# Patient Record
Sex: MALE | Race: WHITE
[De-identification: names, ages, dates, MRNs, and addresses within clinical notes are randomized per-mention and may not be internally consistent; named-entity substitution may affect disease eponyms.]

---

## 2018-12-26 ENCOUNTER — HOSPITAL ENCOUNTER (OUTPATIENT)
Dept: HOSPITAL 90 - SHCH | Age: 68
Discharge: HOME | End: 2018-12-26
Attending: INTERNAL MEDICINE
Payer: COMMERCIAL

## 2018-12-26 DIAGNOSIS — I87.2: Primary | ICD-10-CM

## 2018-12-26 PROCEDURE — 93970 EXTREMITY STUDY: CPT

## 2019-06-10 ENCOUNTER — HOSPITAL ENCOUNTER (OUTPATIENT)
Dept: HOSPITAL 90 - SHCH | Age: 69
Discharge: HOME | End: 2019-06-10
Attending: INTERNAL MEDICINE
Payer: COMMERCIAL

## 2019-06-10 DIAGNOSIS — Z09: Primary | ICD-10-CM

## 2019-06-10 PROCEDURE — 93971 EXTREMITY STUDY: CPT

## 2019-10-08 ENCOUNTER — HOSPITAL ENCOUNTER (OUTPATIENT)
Dept: HOSPITAL 90 - SHCH | Age: 69
Discharge: HOME | End: 2019-10-08
Attending: INTERNAL MEDICINE
Payer: COMMERCIAL

## 2019-10-08 DIAGNOSIS — Z09: Primary | ICD-10-CM

## 2019-10-08 PROCEDURE — 93971 EXTREMITY STUDY: CPT

## 2023-04-20 ENCOUNTER — HOSPITAL ENCOUNTER (OUTPATIENT)
Dept: HOSPITAL 90 - SHCH | Age: 73
Discharge: HOME | End: 2023-04-20
Attending: INTERNAL MEDICINE
Payer: COMMERCIAL

## 2023-04-20 DIAGNOSIS — I73.9: ICD-10-CM

## 2023-04-20 DIAGNOSIS — I87.2: Primary | ICD-10-CM

## 2023-04-20 PROCEDURE — 93970 EXTREMITY STUDY: CPT

## 2023-04-20 PROCEDURE — 93925 LOWER EXTREMITY STUDY: CPT

## 2023-04-28 ENCOUNTER — HOSPITAL ENCOUNTER (OUTPATIENT)
Dept: HOSPITAL 90 - SHCH | Age: 73
Discharge: HOME | End: 2023-04-28
Attending: INTERNAL MEDICINE
Payer: COMMERCIAL

## 2023-04-28 DIAGNOSIS — I25.10: Primary | ICD-10-CM

## 2023-04-28 PROCEDURE — 96374 THER/PROPH/DIAG INJ IV PUSH: CPT

## 2023-04-28 PROCEDURE — 78452 HT MUSCLE IMAGE SPECT MULT: CPT

## 2023-04-28 PROCEDURE — 93017 CV STRESS TEST TRACING ONLY: CPT

## 2024-12-02 ENCOUNTER — HOSPITAL ENCOUNTER (OUTPATIENT)
Dept: HOSPITAL 90 - SHCH | Age: 74
Discharge: HOME | End: 2024-12-02
Attending: INTERNAL MEDICINE
Payer: COMMERCIAL

## 2024-12-02 DIAGNOSIS — I87.2: Primary | ICD-10-CM

## 2024-12-02 PROCEDURE — 93970 EXTREMITY STUDY: CPT

## 2024-12-02 NOTE — [facility]
--------------- APPROVED REPORT --------------





Bilateral Lower Extremity Venous Study for DVT., Venous Competence.Study performed with patient in up
right position or in reverse Trendelenburg.



Past History

06/2019 CV LGSV, 10/2019 CV RGSV, 06/2023 LCIV/EIV Stent, 09/2023 RCIV/EIV stent



Vein Imaging

CFV (R): Normal flow, augmentation and compression. No evidence of DVT, Diameter 15.1 mm 

SFJ (R): Normal flow, augmentation and compression. No evidence of DVT. 

FEM (R): Normal flow, augmentation and compression. No evidence of DVT. 

POP (R): Normal flow, augmentation and compression. No evidence of DVT, 289 ms of DVR.

DFV (R): Normal flow, augmentation and compression. No evidence of DVT. 

PTV (R): Normal flow, augmentation and compression. No evidence of DVT. 

Peroneals (R): Normal flow, augmentation and compression. No evidence of DVT. 

GAS (R): Normal flow, augmentation and compression. No evidence of DVT.Normal flow, augmentation and 
compression. No evidence of DVT. CFV (L): Normal flow, augmentation and compression. No evidence of 
DVT, Diameter 13.2 mm, 539 ms of DVR.

SFJ (L): Normal flow, augmentation and compression. No evidence of DVT. 

FEM (L): Normal flow, augmentation and compression. No evidence of DVT. 

POP (L): Normal flow, augmentation and compression. No evidence of DVT. 

DFV (L): Normal flow, augmentation and compression. No evidence of DVT. 

PTV (L): Normal flow, augmentation and compression. No evidence of DVT. 

Peroneals (L): Normal flow, augmentation and compression. No evidence of DVT. 

GAS (L): Normal flow, augmentation and compression. No evidence of DVT. 



Technologist Impression

The deep veins of the bilateral lower extremities appear patent and compressible without evidence of 
thrombus. 

Deep venous reflux noted.

There is evidence of significant superficial venous insufficiency in the residual right greater saphe
nous vein.



RGSV 

Junction 3.1 mm 0 ms  

Mid thigh-Occluded  

Knee-Occluded 

Mid- calf 3.4 mm 1828 ms 



RSSV 

Prox 1.9 mm 0 ms 

Mid 0.8 mm 0 ms 



LGSV     

Junction 3.4 mm 0 ms  

Mid thigh-Occluded 

Knee-Occluded  

Mid-calf 2.1 mm 444 ms 



LSSV 

Prox 1.4 mm 0 ms

Mid 1.1 mm 0 ms 







Conclusion

No DVT

Significant superficial venous reflux of residual right greater saphenous vein







Conclusion

No DVT

Significant superficial venous reflux of residual right greater saphenous vein